# Patient Record
Sex: MALE | Race: ASIAN | NOT HISPANIC OR LATINO | URBAN - METROPOLITAN AREA
[De-identification: names, ages, dates, MRNs, and addresses within clinical notes are randomized per-mention and may not be internally consistent; named-entity substitution may affect disease eponyms.]

---

## 2018-03-27 ENCOUNTER — OFFICE VISIT - HEALTHEAST (OUTPATIENT)
Dept: INTERNAL MEDICINE | Facility: CLINIC | Age: 23
End: 2018-03-27

## 2018-03-27 DIAGNOSIS — Z71.84 TRAVEL ADVICE ENCOUNTER: ICD-10-CM

## 2018-03-27 ASSESSMENT — MIFFLIN-ST. JEOR: SCORE: 1729.72

## 2018-04-12 ENCOUNTER — COMMUNICATION - HEALTHEAST (OUTPATIENT)
Dept: INTERNAL MEDICINE | Facility: CLINIC | Age: 23
End: 2018-04-12

## 2018-04-16 ENCOUNTER — COMMUNICATION - HEALTHEAST (OUTPATIENT)
Dept: INTERNAL MEDICINE | Facility: CLINIC | Age: 23
End: 2018-04-16

## 2021-06-01 VITALS — BODY MASS INDEX: 30.04 KG/M2 | WEIGHT: 180.3 LBS | HEIGHT: 65 IN

## 2021-06-16 NOTE — PROGRESS NOTES
"[facility] Clinic Note  Patient Name: Carmelita Cartwright  Patient Age: 23 y.o.  YOB: 1995  MRN: [id number]  ?  Date of Visit: 3/27/2018  Reason for Office Visit:   Chief Complaint   Patient presents with     Travel Consult     Going to [place] leaving May 18-May 22nd       HPI: Carmelita Cartwright 23 y.o. who presents to clinic for travel consult. 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 He will be living with a family there. He is healthy without any significant PMH. He has no other concerns today. We reviewed his immunization record and CDC recommendations for travel to [place]. He should have his typhoid and hep A and will be due for tetanus while there so will also give this today.      Review of Systems: As noted in HPI     Current Scheduled Meds:  No outpatient encounter prescriptions on file as of 3/27/2018.     No facility-administered encounter medications on file as of 3/27/2018.        Objective / Physical Examination:  /82 (Patient Site: Right Arm, Patient Position: Sitting, Cuff Size: Adult Regular)  Pulse 91  Ht 5' 5\" (1.651 m)  Wt 180 lb 4.8 oz (81.8 kg)  BMI 30 kg/m2  Wt Readings from Last 3 Encounters:   03/27/18 180 lb 4.8 oz (81.8 kg)     Body mass index is 30 kg/(m^2). (>25?)    General Appearance: Alert and oriented in no acute distress  CV: RRR  Pulm: CTA  Neuro: no gross deficits  Psych: pleasant, interactive, appropriate mood     Assessment / Plan / Medical Decision Making:      Encounter Diagnoses   Name Primary?     Travel advice encounter Yes        1. Travel advice encounter    - Td, Preservative Free (green label)  - Typhoid, Inactive, Inj  - Hepatitis A adult 2 dose IM (19 yr & older)    Follow up as needed    Andrea Geiger MD  [facility]   "

## 2022-10-01 ENCOUNTER — HEALTH MAINTENANCE LETTER (OUTPATIENT)
Age: 27
End: 2022-10-01

## 2023-10-15 ENCOUNTER — HEALTH MAINTENANCE LETTER (OUTPATIENT)
Age: 28
End: 2023-10-15